# Patient Record
Sex: FEMALE | Race: WHITE | NOT HISPANIC OR LATINO
[De-identification: names, ages, dates, MRNs, and addresses within clinical notes are randomized per-mention and may not be internally consistent; named-entity substitution may affect disease eponyms.]

---

## 2017-07-31 ENCOUNTER — APPOINTMENT (OUTPATIENT)
Dept: OBGYN | Facility: CLINIC | Age: 32
End: 2017-07-31
Payer: COMMERCIAL

## 2017-07-31 VITALS
HEART RATE: 92 BPM | WEIGHT: 186 LBS | SYSTOLIC BLOOD PRESSURE: 127 MMHG | DIASTOLIC BLOOD PRESSURE: 84 MMHG | HEIGHT: 60 IN | BODY MASS INDEX: 36.52 KG/M2

## 2017-07-31 DIAGNOSIS — Z86.59 PERSONAL HISTORY OF OTHER MENTAL AND BEHAVIORAL DISORDERS: ICD-10-CM

## 2017-07-31 DIAGNOSIS — Z20.2 CONTACT WITH AND (SUSPECTED) EXPOSURE TO INFECTIONS WITH A PREDOMINANTLY SEXUAL MODE OF TRANSMISSION: ICD-10-CM

## 2017-07-31 DIAGNOSIS — Z87.81 PERSONAL HISTORY OF (HEALED) TRAUMATIC FRACTURE: ICD-10-CM

## 2017-07-31 DIAGNOSIS — Z86.39 PERSONAL HISTORY OF OTHER ENDOCRINE, NUTRITIONAL AND METABOLIC DISEASE: ICD-10-CM

## 2017-07-31 DIAGNOSIS — Z87.898 PERSONAL HISTORY OF OTHER SPECIFIED CONDITIONS: ICD-10-CM

## 2017-07-31 DIAGNOSIS — Z87.09 PERSONAL HISTORY OF OTHER DISEASES OF THE RESPIRATORY SYSTEM: ICD-10-CM

## 2017-07-31 DIAGNOSIS — Z87.39 PERSONAL HISTORY OF OTHER DISEASES OF THE MUSCULOSKELETAL SYSTEM AND CONNECTIVE TISSUE: ICD-10-CM

## 2017-07-31 PROCEDURE — 99385 PREV VISIT NEW AGE 18-39: CPT

## 2017-07-31 RX ORDER — TOPIRAMATE 25 MG
CAPSULE, SPRINKLE ORAL
Refills: 0 | Status: ACTIVE | COMMUNITY

## 2017-07-31 RX ORDER — ATOMOXETINE HYDROCHLORIDE 25 MG/1
25 CAPSULE ORAL
Refills: 0 | Status: ACTIVE | COMMUNITY

## 2017-07-31 RX ORDER — FLUOXETINE HYDROCHLORIDE 40 MG/1
40 CAPSULE ORAL
Refills: 0 | Status: ACTIVE | COMMUNITY

## 2017-07-31 RX ORDER — ARIPIPRAZOLE 2 MG/1
2 TABLET ORAL
Refills: 0 | Status: ACTIVE | COMMUNITY

## 2017-07-31 RX ORDER — LEVOTHYROXINE SODIUM 0.17 MG/1
TABLET ORAL
Refills: 0 | Status: ACTIVE | COMMUNITY

## 2017-08-01 LAB
C TRACH RRNA SPEC QL NAA+PROBE: NORMAL
HPV HIGH+LOW RISK DNA PNL CVX: POSITIVE
N GONORRHOEA RRNA SPEC QL NAA+PROBE: NORMAL
SOURCE TP AMPLIFICATION: NORMAL

## 2017-08-03 LAB — CYTOLOGY CVX/VAG DOC THIN PREP: NORMAL

## 2017-08-07 ENCOUNTER — LABORATORY RESULT (OUTPATIENT)
Age: 32
End: 2017-08-07

## 2017-08-17 ENCOUNTER — APPOINTMENT (OUTPATIENT)
Dept: OBGYN | Facility: CLINIC | Age: 32
End: 2017-08-17
Payer: COMMERCIAL

## 2017-08-17 VITALS
DIASTOLIC BLOOD PRESSURE: 80 MMHG | SYSTOLIC BLOOD PRESSURE: 120 MMHG | WEIGHT: 186 LBS | HEIGHT: 60 IN | BODY MASS INDEX: 36.52 KG/M2

## 2017-08-17 LAB
HCG UR QL: NEGATIVE
QUALITY CONTROL: YES

## 2017-08-17 PROCEDURE — 57454 BX/CURETT OF CERVIX W/SCOPE: CPT

## 2017-08-17 PROCEDURE — 81025 URINE PREGNANCY TEST: CPT

## 2017-08-28 LAB — CORE LAB BIOPSY: NORMAL

## 2017-09-11 ENCOUNTER — APPOINTMENT (OUTPATIENT)
Dept: OBGYN | Facility: CLINIC | Age: 32
End: 2017-09-11
Payer: COMMERCIAL

## 2017-09-11 VITALS
HEIGHT: 60 IN | HEART RATE: 109 BPM | WEIGHT: 187 LBS | DIASTOLIC BLOOD PRESSURE: 84 MMHG | SYSTOLIC BLOOD PRESSURE: 120 MMHG | BODY MASS INDEX: 36.71 KG/M2

## 2017-09-11 PROCEDURE — 99213 OFFICE O/P EST LOW 20 MIN: CPT

## 2017-09-30 ENCOUNTER — TRANSCRIPTION ENCOUNTER (OUTPATIENT)
Age: 32
End: 2017-09-30

## 2018-02-05 ENCOUNTER — APPOINTMENT (OUTPATIENT)
Dept: OBGYN | Facility: CLINIC | Age: 33
End: 2018-02-05
Payer: COMMERCIAL

## 2018-02-05 VITALS
DIASTOLIC BLOOD PRESSURE: 70 MMHG | HEIGHT: 60 IN | SYSTOLIC BLOOD PRESSURE: 106 MMHG | BODY MASS INDEX: 37.89 KG/M2 | WEIGHT: 193 LBS

## 2018-02-05 DIAGNOSIS — R39.15 URGENCY OF URINATION: ICD-10-CM

## 2018-02-05 LAB
BILIRUB UR QL STRIP: NEGATIVE
GLUCOSE UR-MCNC: NEGATIVE
HCG UR QL: 0.2 EU/DL
HGB UR QL STRIP.AUTO: NORMAL
KETONES UR-MCNC: NEGATIVE
LEUKOCYTE ESTERASE UR QL STRIP: NEGATIVE
NITRITE UR QL STRIP: NEGATIVE
PH UR STRIP: 7
PROT UR STRIP-MCNC: NEGATIVE
SP GR UR STRIP: 1.01

## 2018-02-05 PROCEDURE — 99214 OFFICE O/P EST MOD 30 MIN: CPT

## 2018-02-05 PROCEDURE — 81003 URINALYSIS AUTO W/O SCOPE: CPT | Mod: QW

## 2018-02-06 LAB — HPV HIGH+LOW RISK DNA PNL CVX: NOT DETECTED

## 2018-02-09 LAB — BACTERIA UR CULT: ABNORMAL

## 2018-02-12 LAB — CYTOLOGY CVX/VAG DOC THIN PREP: NORMAL

## 2018-07-23 PROBLEM — Z86.59 HISTORY OF ANXIETY: Status: RESOLVED | Noted: 2017-07-31 | Resolved: 2018-07-23

## 2019-06-08 ENCOUNTER — APPOINTMENT (OUTPATIENT)
Dept: OBGYN | Facility: CLINIC | Age: 34
End: 2019-06-08
Payer: COMMERCIAL

## 2019-06-08 VITALS
HEART RATE: 101 BPM | HEIGHT: 60 IN | BODY MASS INDEX: 36.32 KG/M2 | SYSTOLIC BLOOD PRESSURE: 118 MMHG | WEIGHT: 185 LBS | DIASTOLIC BLOOD PRESSURE: 83 MMHG

## 2019-06-08 DIAGNOSIS — Z78.9 OTHER SPECIFIED HEALTH STATUS: ICD-10-CM

## 2019-06-08 DIAGNOSIS — B37.2 CANDIDIASIS OF SKIN AND NAIL: ICD-10-CM

## 2019-06-08 PROCEDURE — 99214 OFFICE O/P EST MOD 30 MIN: CPT

## 2019-06-08 RX ORDER — ATOMOXETINE 100 MG/1
100 CAPSULE ORAL
Qty: 30 | Refills: 0 | Status: ACTIVE | COMMUNITY
Start: 2018-08-24

## 2019-06-08 RX ORDER — OSELTAMIVIR PHOSPHATE 75 MG/1
75 CAPSULE ORAL
Qty: 10 | Refills: 0 | Status: COMPLETED | COMMUNITY
Start: 2019-02-09

## 2019-06-08 RX ORDER — NITROFURANTOIN (MONOHYDRATE/MACROCRYSTALS) 25; 75 MG/1; MG/1
100 CAPSULE ORAL
Qty: 10 | Refills: 0 | Status: DISCONTINUED | COMMUNITY
Start: 2018-02-05 | End: 2019-06-08

## 2019-06-08 RX ORDER — SULFAMETHOXAZOLE AND TRIMETHOPRIM 800; 160 MG/1; MG/1
800-160 TABLET ORAL
Qty: 10 | Refills: 0 | Status: COMPLETED | COMMUNITY
Start: 2018-02-09 | End: 2019-06-08

## 2019-06-08 RX ORDER — TOPIRAMATE 25 MG/1
25 TABLET, FILM COATED ORAL
Qty: 90 | Refills: 0 | Status: ACTIVE | COMMUNITY
Start: 2018-08-13

## 2019-06-08 RX ORDER — AZITHROMYCIN 250 MG/1
250 TABLET, FILM COATED ORAL
Qty: 6 | Refills: 0 | Status: COMPLETED | COMMUNITY
Start: 2019-02-09

## 2019-06-08 RX ORDER — AMOXICILLIN AND CLAVULANATE POTASSIUM 875; 125 MG/1; MG/1
875-125 TABLET, COATED ORAL
Qty: 14 | Refills: 0 | Status: COMPLETED | COMMUNITY
Start: 2019-02-26

## 2019-06-08 RX ORDER — FLUOXETINE HYDROCHLORIDE 40 MG/1
CAPSULE ORAL
Refills: 0 | Status: ACTIVE | COMMUNITY

## 2019-06-08 RX ORDER — CLOTRIMAZOLE AND BETAMETHASONE DIPROPIONATE 10; .5 MG/G; MG/G
1-0.05 CREAM TOPICAL TWICE DAILY
Qty: 1 | Refills: 0 | Status: ACTIVE | COMMUNITY
Start: 2019-06-08 | End: 1900-01-01

## 2019-06-08 RX ORDER — ARIPIPRAZOLE 30 MG/1
30 TABLET ORAL
Qty: 30 | Refills: 0 | Status: ACTIVE | COMMUNITY
Start: 2019-02-03

## 2019-06-08 RX ORDER — LITHIUM CARBONATE 300 MG/1
300 TABLET, FILM COATED, EXTENDED RELEASE ORAL
Qty: 90 | Refills: 0 | Status: ACTIVE | COMMUNITY
Start: 2018-12-17

## 2019-06-08 RX ORDER — FLUOXETINE HYDROCHLORIDE 20 MG/1
20 CAPSULE ORAL
Qty: 30 | Refills: 0 | Status: ACTIVE | COMMUNITY
Start: 2019-02-18

## 2019-06-08 NOTE — PHYSICAL EXAM
[No Bleeding] : there was no active vaginal bleeding [Uterine Adnexae] : were not tender and not enlarged [Normal] : urethra [FreeTextEntry4] : clear dc cultured [de-identified] : slightly erythematous [de-identified] : cutaneus monila rash in the groin

## 2019-06-08 NOTE — PHYSICAL EXAM
[No Bleeding] : there was no active vaginal bleeding [Uterine Adnexae] : were not tender and not enlarged [Normal] : urethra [de-identified] : cutaneus monila rash in the groin  [FreeTextEntry4] : clear dc cultured [de-identified] : slightly erythematous

## 2019-06-11 LAB — BACTERIA GENITAL AEROBE CULT: NORMAL

## 2019-06-17 ENCOUNTER — APPOINTMENT (OUTPATIENT)
Dept: OBGYN | Facility: CLINIC | Age: 34
End: 2019-06-17
Payer: COMMERCIAL

## 2019-06-17 VITALS
HEIGHT: 60 IN | DIASTOLIC BLOOD PRESSURE: 70 MMHG | WEIGHT: 185 LBS | BODY MASS INDEX: 36.32 KG/M2 | SYSTOLIC BLOOD PRESSURE: 120 MMHG

## 2019-06-17 PROCEDURE — 99395 PREV VISIT EST AGE 18-39: CPT

## 2019-06-17 NOTE — PHYSICAL EXAM
[Alert] : alert [Acute Distress] : no acute distress [Awake] : awake [Nipple Discharge] : no nipple discharge [Mass] : no breast mass [Axillary LAD] : no axillary lymphadenopathy [Soft] : soft [Oriented x3] : oriented to person, place, and time [Tender] : non tender [No Bleeding] : there was no active vaginal bleeding [Uterine Adnexae] : were not tender and not enlarged [Normal] : uterus [CTAB] : CTAB [RRR, No Murmurs] : RRR, no murmurs

## 2019-06-18 LAB
C TRACH RRNA SPEC QL NAA+PROBE: NOT DETECTED
HPV HIGH+LOW RISK DNA PNL CVX: NOT DETECTED
N GONORRHOEA RRNA SPEC QL NAA+PROBE: NOT DETECTED
SOURCE AMPLIFICATION: NORMAL

## 2019-06-20 LAB — CYTOLOGY CVX/VAG DOC THIN PREP: NORMAL

## 2019-07-22 ENCOUNTER — APPOINTMENT (OUTPATIENT)
Dept: OBGYN | Facility: CLINIC | Age: 34
End: 2019-07-22

## 2019-07-30 ENCOUNTER — APPOINTMENT (OUTPATIENT)
Dept: OBGYN | Facility: CLINIC | Age: 34
End: 2019-07-30
Payer: COMMERCIAL

## 2019-07-30 PROCEDURE — 57454 BX/CURETT OF CERVIX W/SCOPE: CPT

## 2019-07-30 NOTE — PROCEDURE
[Colposcopy] : colposcopy [LGSIL] : low grade squamous intraepithelial lesion [Patient] : patient [Risks] : risks [Benefits] : benefits [Alternatives] : alternatives [Infection] : infection [Bleeding] : bleeding [Allergic Reaction] : allergic reaction [Consent Obtained] : written consent was obtained prior to the procedure [No Abnormalities] : no abnormalities [Biopsies Taken: # ___] : [unfilled] biopsies taken of the cervix [Acetowhite ___ o'clock] : ascetowhite changes at [unfilled] ~Uo'clock [Biopsy Locations ___ o'clock] : the biopsies were taken at [unfilled] o'clock [ECC Done] : Endocervical curettage was performed.  [Luan's] : Luan's solution [No Complications] : there were no complications [Tolerated Well] : the patient tolerated the procedure well

## 2019-08-05 LAB — CORE LAB BIOPSY: NORMAL

## 2019-08-19 ENCOUNTER — APPOINTMENT (OUTPATIENT)
Dept: OBGYN | Facility: CLINIC | Age: 34
End: 2019-08-19
Payer: COMMERCIAL

## 2019-08-19 VITALS
DIASTOLIC BLOOD PRESSURE: 86 MMHG | BODY MASS INDEX: 37.89 KG/M2 | HEART RATE: 109 BPM | WEIGHT: 193 LBS | SYSTOLIC BLOOD PRESSURE: 119 MMHG | HEIGHT: 60 IN

## 2019-08-19 PROCEDURE — 56501 DESTROY VULVA LESIONS SIM: CPT

## 2021-02-08 ENCOUNTER — APPOINTMENT (OUTPATIENT)
Dept: OBGYN | Facility: CLINIC | Age: 36
End: 2021-02-08
Payer: COMMERCIAL

## 2021-02-08 VITALS
HEIGHT: 60 IN | DIASTOLIC BLOOD PRESSURE: 82 MMHG | WEIGHT: 181 LBS | SYSTOLIC BLOOD PRESSURE: 119 MMHG | BODY MASS INDEX: 35.53 KG/M2 | HEART RATE: 94 BPM

## 2021-02-08 DIAGNOSIS — Z11.3 ENCOUNTER FOR SCREENING FOR INFECTIONS WITH A PREDOMINANTLY SEXUAL MODE OF TRANSMISSION: ICD-10-CM

## 2021-02-08 DIAGNOSIS — B97.7 PAPILLOMAVIRUS AS THE CAUSE OF DISEASES CLASSIFIED ELSEWHERE: ICD-10-CM

## 2021-02-08 DIAGNOSIS — Z01.419 ENCOUNTER FOR GYNECOLOGICAL EXAMINATION (GENERAL) (ROUTINE) W/OUT ABNORMAL FINDINGS: ICD-10-CM

## 2021-02-08 PROCEDURE — 99072 ADDL SUPL MATRL&STAF TM PHE: CPT

## 2021-02-08 PROCEDURE — 99395 PREV VISIT EST AGE 18-39: CPT

## 2021-02-15 LAB
C TRACH RRNA SPEC QL NAA+PROBE: NOT DETECTED
HPV HIGH+LOW RISK DNA PNL CVX: NOT DETECTED
N GONORRHOEA RRNA SPEC QL NAA+PROBE: NOT DETECTED
SOURCE TP AMPLIFICATION: NORMAL

## 2021-02-22 LAB — CYTOLOGY CVX/VAG DOC THIN PREP: ABNORMAL

## 2021-04-07 ENCOUNTER — APPOINTMENT (OUTPATIENT)
Dept: ORTHOPEDIC SURGERY | Facility: CLINIC | Age: 36
End: 2021-04-07
Payer: COMMERCIAL

## 2021-04-07 VITALS
DIASTOLIC BLOOD PRESSURE: 86 MMHG | WEIGHT: 181 LBS | SYSTOLIC BLOOD PRESSURE: 120 MMHG | TEMPERATURE: 97.2 F | HEIGHT: 60 IN | HEART RATE: 94 BPM | BODY MASS INDEX: 35.53 KG/M2

## 2021-04-07 DIAGNOSIS — Z86.59 PERSONAL HISTORY OF OTHER MENTAL AND BEHAVIORAL DISORDERS: ICD-10-CM

## 2021-04-07 PROCEDURE — 99072 ADDL SUPL MATRL&STAF TM PHE: CPT

## 2021-04-07 PROCEDURE — 73562 X-RAY EXAM OF KNEE 3: CPT | Mod: RT

## 2021-04-07 PROCEDURE — 99204 OFFICE O/P NEW MOD 45 MIN: CPT

## 2021-04-07 NOTE — DISCUSSION/SUMMARY
[de-identified] : 34 y/o F with mild posttraumatic osteoarthritis and evidence of an ACL reconstruction in the right knee. She is s/p ACL autograph reconstruction. The pt only experiences pain while kneeling. The patient will get an MRI of the right knee to r/o a medial parameniscal cyst. Additionally, the patient occasionally has instability in her right knee. She is a candidate for an ACL allograft revision which was discussed in detail with the patient. She is not interested in having the revision ACL reconstruction. F/u after the MRI. \par \par \par ACL reconstruction allograft: With the diagnosis of a completely torn anterior cruciate ligament (ACL), the choices are non-operative versus operative. The non-operative success is approximately a 33% chance of the patient's returning to having a functional knee again. By that I mean back to their sport endeavors. Two-thirds of the patients for the most part and as a generalization will go on to have problems such as tears in the meniscus, destruction of articular cartilage, and eventually arthritis. All of these patients will be required to use a brace and must always participate in a conditioning (strengthening) program, if they plan to participate in any sports. The other option is operative intervention, which, today, yields success rates ranging from 85 to 90%. Success is once again defined as an ability to return to a functional sporting lifestyle with or without a brace. In this case, I would, if the patient chooses surgery recommend an anterior cruciate ligament reconstruction with bone patella tendon bone allograft. The allograft, which necessitated a 2-3 inch skin incision, is arthroscopically positioned through the tibial and femoral tunnels to isometrically recreate the normal tension of the anterior cruciate ligament. The graft, once situated, is then fixed with two screws that are compressive within the tunnels. Sometimes on the tibial side, we use a tibial post, where a suture is tied around a screw placed perpendicular to the shaft of the tibia. The choice of technique is dependent upon the length of the bone tendon bone preparation. In some cases it is necessary due to subsequent discomfort that the screw be removed a year or so after surgery. The usual course for an ACL reconstruction is hospitalization of 24 hours (range 0 to 48 hours) with immediate continuous passive motion and partial to full weight bearing crutch walking. Crutches are usually dispensed with by 2 to 3 weeks. Patients who have desk jobs are usually back at work within a week. Driving is not permitted until there is good control of this leg. It will usually be 3 to 6 weeks that the patient is restricted from driving. The motion stage of therapy takes about 3 weeks while the strength stage averages 6 months from surgery for patients to return to sports. I let the patient know that some people have done it as early as 3 months, which I think is somewhat unrealistic. I have seen people not work hard and become a disaster, taking as long as 2 years. While the success rate (return to a functional sports lifestyle) is 85 to 90% potential, there are adverse complications. Complications include infection (approximately 1%) peroneal nerve palsies which would mean loss of foot function (<1%), loss of screw fixation at either the tibial or femoral tunnels (<1%), fracture of the patella (<1%), rupture of the remaining patella tendon (<1%), and fracture of the tibia where the bone graft was taken from (<1%). Other bizarre complications include reflex sympathetic dystrophy, which means that the patient has pain, which is totally out of proportion with the findings. This requires multiple manipulation procedures in physical therapy and can become an over bearing part of the patient's life for well over several years. Many of the manipulations might have to be performed with associated arthroscopy. Infrapatellar contracture syndromes are also another rare complication where the patient has limited extension and flexion due to scarring. This is also a very difficult experience for the patient and will often require multiple surgical procedures, arthroscopy, manipulation, and sometimes arthrotomy. I have informed the patient that the meniscus and/or articular cartilage can be injured concurrently with the anterior cruciate ligament. With the meniscus tears the basic idea is to repair (if possible) or remove as little of the tear as necessary. When either of these procedures is performed the postoperative routine are potential complications parallel those of the ACL reconstruction. With articular cartilage damage, the surface can be shaved, and in certain situations a portion placed back in situ. Sometimes the only treatment is drilling or eburnating the bone for the theoretical advantage of stimulating fibrocartilage growth (50/50 chance). When either of these procedures is performed the postoperative routine are potential complications parallel those of the ACL reconstruction. The allograft is basically a cadaver transplant of a bone patella tendon bone preparation. Other than the lack of harvesting the patient's tissue, there are basically no differences in the surgical technique of an autograft. However, there are two major sources of concern using an allograft. One, there is a theoretical albeit extremely rare risk of AIDS. We have heard figures suggesting a statistical risk from 1 in 2 million to 1 in 8 million. Obviously these are low risk of a fatal disease exist. For any one patient, there is really 0 or 100% risk of keenan the HIV virus. The second issue, is the ability of the patient (host) to incorporate the allograft. At the present time there does not seem to be any long tern adverse effects that negate the early effectiveness of allograft reconstruction for a deficient anterior cruciate ligament. The biology does, however, seem to mean that true incorporation is microscopically longer than an autograft procedure. Further studies are unquestionably forthcoming and at this point this is the best information that I can give this patient. I have informed this patient that any one of these complications can basically be a life long disaster and can make them much worse off then should they have chosen the non-operative treatment. I think the patient understands the potential complications. I think the patient also understands the percentage of successes and failures. The choice is now theirs.

## 2021-04-07 NOTE — END OF VISIT
[FreeTextEntry3] : I, Pritesh Lindsey, acted solely as a scribe for Dr. Ed Ley on this date 04/07/2021.

## 2021-04-07 NOTE — HISTORY OF PRESENT ILLNESS
[___ mths] : [unfilled] month(s) ago [0] : a minimum pain level of 0/10 [10] : a maximum pain level of 10/10 [Rest] : relieved by rest [Intermit.] : ~He/She~ states the symptoms seem to be intermittent [de-identified] : 36 y/o F presents with right knee pain which started on 2/1/2021. She previously tore her ACL and meniscus in 2011. Pt is s/p ACL autograph reconstruction from 2011. A couple of years later, she re-tore her meniscus but was not treated surgically. She consulted Dr. Ladinsky on 3/2/2021. She only experiences pain when she kneels due to "a bump on the knee." She describes the pain as sharp and stabbing. Her knee sometimes jeanie when she walks. [de-identified] : kneeling

## 2021-04-07 NOTE — PHYSICAL EXAM
[LE] : Sensory: Intact in bilateral lower extremities [Normal] : Alert and in no acute distress [ALL] : dorsalis pedis, posterior tibial, femoral, popliteal, and radial 2+ and symmetric bilaterally [Poor Appearance] : well-appearing [de-identified] : GENERAL APPEARANCE: Well nourished and hydrated, pleasant, alert, and oriented x 3. Appears their stated age. \par HEENT: Normocephalic, extraocular eye motion intact. Nasal septum midline. Oral cavity clear. External auditory canal clear. \par RESPIRATORY: Breath sounds clear and audible in all lobes. No wheezing, No accessory muscle use.\par CARDIOVASCULAR: No apparent abnormalities. No lower leg edema. No varicosities. Pedal pulses are palpable.\par NEUROLOGIC: Sensation is normal, no muscle weakness in the upper or lower extremities.\par DERMATOLOGIC: No apparent skin lesions, moist, warm, no rash.\par SPINE: Cervical spine appears normal and moves freely; thoracic spine appears normal and moves freely; lumbosacral spine appears normal and moves freely, normal, nontender.\par MUSCULOSKELETAL: Hands, wrists, and elbows are normal and move freely, shoulders are normal and move freely.  [de-identified] : Right knee exam shows healed incision from ACL reconstruction with no sign of infection, no edema, positive Lochmanns\par It appears she has a medial parameniscal cyst below the medial joint line.  [de-identified] : 3V xray of the right knee done in the office today and reviewed by Dr. Ed Ley demonstrates mild posttraumatic osteoarthritis and evidence of an ACL reconstruction.

## 2021-04-17 ENCOUNTER — APPOINTMENT (OUTPATIENT)
Dept: MRI IMAGING | Facility: CLINIC | Age: 36
End: 2021-04-17
Payer: COMMERCIAL

## 2021-04-17 ENCOUNTER — OUTPATIENT (OUTPATIENT)
Dept: OUTPATIENT SERVICES | Facility: HOSPITAL | Age: 36
LOS: 1 days | End: 2021-04-17

## 2021-04-17 DIAGNOSIS — S83.511D SPRAIN OF ANTERIOR CRUCIATE LIGAMENT OF RIGHT KNEE, SUBSEQUENT ENCOUNTER: ICD-10-CM

## 2021-04-17 DIAGNOSIS — Z98.89 OTHER SPECIFIED POSTPROCEDURAL STATES: Chronic | ICD-10-CM

## 2021-04-17 PROCEDURE — 73721 MRI JNT OF LWR EXTRE W/O DYE: CPT | Mod: 26,RT

## 2021-04-23 ENCOUNTER — APPOINTMENT (OUTPATIENT)
Dept: ORTHOPEDIC SURGERY | Facility: CLINIC | Age: 36
End: 2021-04-23
Payer: COMMERCIAL

## 2021-04-23 VITALS
HEART RATE: 105 BPM | SYSTOLIC BLOOD PRESSURE: 134 MMHG | DIASTOLIC BLOOD PRESSURE: 90 MMHG | HEIGHT: 60 IN | WEIGHT: 181 LBS | BODY MASS INDEX: 35.53 KG/M2

## 2021-04-23 DIAGNOSIS — M23.006 CYSTIC MENISCUS, UNSPECIFIED MENISCUS, RIGHT KNEE: ICD-10-CM

## 2021-04-23 DIAGNOSIS — S83.511D SPRAIN OF ANTERIOR CRUCIATE LIGAMENT OF RIGHT KNEE, SUBSEQUENT ENCOUNTER: ICD-10-CM

## 2021-04-23 PROCEDURE — 99213 OFFICE O/P EST LOW 20 MIN: CPT

## 2021-04-23 PROCEDURE — 99072 ADDL SUPL MATRL&STAF TM PHE: CPT

## 2021-04-23 NOTE — PHYSICAL EXAM
[LE] : Sensory: Intact in bilateral lower extremities [ALL] : dorsalis pedis, posterior tibial, femoral, popliteal, and radial 2+ and symmetric bilaterally [Normal] : Alert and in no acute distress [Poor Appearance] : well-appearing [de-identified] : GENERAL APPEARANCE: Well nourished and hydrated, pleasant, alert, and oriented x 3. Appears their stated age. \par HEENT: Normocephalic, extraocular eye motion intact. Nasal septum midline. Oral cavity clear. External auditory canal clear. \par RESPIRATORY: Breath sounds clear and audible in all lobes. No wheezing, No accessory muscle use.\par CARDIOVASCULAR: No apparent abnormalities. No lower leg edema. No varicosities. Pedal pulses are palpable.\par NEUROLOGIC: Sensation is normal, no muscle weakness in the upper or lower extremities.\par DERMATOLOGIC: No apparent skin lesions, moist, warm, no rash.\par SPINE: Cervical spine appears normal and moves freely; thoracic spine appears normal and moves freely; lumbosacral spine appears normal and moves freely, normal, nontender.\par MUSCULOSKELETAL: Hands, wrists, and elbows are normal and move freely, shoulders are normal and move freely.  [de-identified] : Right knee exam shows healed incision from ACL reconstruction with no sign of infection, no edema, positive Lochmanns\par cyst below the medial joint line.  [de-identified] : MRI of the right knee performed on 4/17/2021 showed the following:\par 1. Suspected high-grade tearing of the distal ACL reconstructed graft.\par 2. Cystic expansion of the tibial tunnel with extension of the cyst into the overlying soft tissues with surrounding edema. Infection can have a similar appearance and clinical correlation is recommended.\par 3. Full-thickness tear at the posterior horn/root junction of the medial meniscus. Mild chondral wear in the medial compartment.

## 2021-04-23 NOTE — DISCUSSION/SUMMARY
[de-identified] : 34 y/o F with mild posttraumatic osteoarthritis and evidence of an ACL reconstruction in the right knee. She is s/p ACL autograph reconstruction. The MRI of her right knee showed a high-grade tear in the distal ACL reconstructed graft, cystic expansion of the tibial tunnel, and a medial meniscus tear. She is having instability in the knee as well as pain while kneeling due to the cyst. We explained that the cyst is due to joint fluid going through the tibial tunnel. She inquired about an aspiration, which we explained that it may not be helpful. We had a long discussion with the patient regarding an ACL allograft revision and encourage her to consult Dr. Pereira. We provided a rx for PT to try to help strengthen her muscles and to try to alleviate her symptoms. \par \par \par ACL reconstruction allograft: With the diagnosis of a completely torn anterior cruciate ligament (ACL), the choices are non-operative versus operative. The non-operative success is approximately a 33% chance of the patient's returning to having a functional knee again. By that I mean back to their sport endeavors. Two-thirds of the patients for the most part and as a generalization will go on to have problems such as tears in the meniscus, destruction of articular cartilage, and eventually arthritis. All of these patients will be required to use a brace and must always participate in a conditioning (strengthening) program, if they plan to participate in any sports. The other option is operative intervention, which, today, yields success rates ranging from 85 to 90%. Success is once again defined as an ability to return to a functional sporting lifestyle with or without a brace. In this case, I would, if the patient chooses surgery recommend an anterior cruciate ligament reconstruction with bone patella tendon bone allograft. The allograft, which necessitated a 2-3 inch skin incision, is arthroscopically positioned through the tibial and femoral tunnels to isometrically recreate the normal tension of the anterior cruciate ligament. The graft, once situated, is then fixed with two screws that are compressive within the tunnels. Sometimes on the tibial side, we use a tibial post, where a suture is tied around a screw placed perpendicular to the shaft of the tibia. The choice of technique is dependent upon the length of the bone tendon bone preparation. In some cases it is necessary due to subsequent discomfort that the screw be removed a year or so after surgery. The usual course for an ACL reconstruction is hospitalization of 24 hours (range 0 to 48 hours) with immediate continuous passive motion and partial to full weight bearing crutch walking. Crutches are usually dispensed with by 2 to 3 weeks. Patients who have desk jobs are usually back at work within a week. Driving is not permitted until there is good control of this leg. It will usually be 3 to 6 weeks that the patient is restricted from driving. The motion stage of therapy takes about 3 weeks while the strength stage averages 6 months from surgery for patients to return to sports. I let the patient know that some people have done it as early as 3 months, which I think is somewhat unrealistic. I have seen people not work hard and become a disaster, taking as long as 2 years. While the success rate (return to a functional sports lifestyle) is 85 to 90% potential, there are adverse complications. Complications include infection (approximately 1%) peroneal nerve palsies which would mean loss of foot function (<1%), loss of screw fixation at either the tibial or femoral tunnels (<1%), fracture of the patella (<1%), rupture of the remaining patella tendon (<1%), and fracture of the tibia where the bone graft was taken from (<1%). Other bizarre complications include reflex sympathetic dystrophy, which means that the patient has pain, which is totally out of proportion with the findings. This requires multiple manipulation procedures in physical therapy and can become an over bearing part of the patient's life for well over several years. Many of the manipulations might have to be performed with associated arthroscopy. Infrapatellar contracture syndromes are also another rare complication where the patient has limited extension and flexion due to scarring. This is also a very difficult experience for the patient and will often require multiple surgical procedures, arthroscopy, manipulation, and sometimes arthrotomy. I have informed the patient that the meniscus and/or articular cartilage can be injured concurrently with the anterior cruciate ligament. With the meniscus tears the basic idea is to repair (if possible) or remove as little of the tear as necessary. When either of these procedures is performed the postoperative routine are potential complications parallel those of the ACL reconstruction. With articular cartilage damage, the surface can be shaved, and in certain situations a portion placed back in situ. Sometimes the only treatment is drilling or eburnating the bone for the theoretical advantage of stimulating fibrocartilage growth (50/50 chance). When either of these procedures is performed the postoperative routine are potential complications parallel those of the ACL reconstruction. The allograft is basically a cadaver transplant of a bone patella tendon bone preparation. Other than the lack of harvesting the patient's tissue, there are basically no differences in the surgical technique of an autograft. However, there are two major sources of concern using an allograft. One, there is a theoretical albeit extremely rare risk of AIDS. We have heard figures suggesting a statistical risk from 1 in 2 million to 1 in 8 million. Obviously these are low risk of a fatal disease exist. For any one patient, there is really 0 or 100% risk of keenan the HIV virus. The second issue, is the ability of the patient (host) to incorporate the allograft. At the present time there does not seem to be any long tern adverse effects that negate the early effectiveness of allograft reconstruction for a deficient anterior cruciate ligament. The biology does, however, seem to mean that true incorporation is microscopically longer than an autograft procedure. Further studies are unquestionably forthcoming and at this point this is the best information that I can give this patient. I have informed this patient that any one of these complications can basically be a life long disaster and can make them much worse off then should they have chosen the non-operative treatment. I think the patient understands the potential complications. I think the patient also understands the percentage of successes and failures. The choice is now theirs.

## 2021-04-23 NOTE — HISTORY OF PRESENT ILLNESS
[___ mths] : [unfilled] month(s) ago [0] : a minimum pain level of 0/10 [10] : a maximum pain level of 10/10 [Intermit.] : ~He/She~ states the symptoms seem to be intermittent [Rest] : relieved by rest [de-identified] : 36 y/o F presents with right knee pain which started on 2/1/2021. She previously tore her ACL and meniscus in 2011. Pt is s/p ACL autograph reconstruction from 2011. A couple of years later, she re-tore her meniscus but was not treated surgically. She consulted Dr. Ladinsky on 3/2/2021. She only experiences pain when she kneels due to "a bump on the knee." She describes the pain as sharp and stabbing. Her knee sometimes jeanie when she walks. Pt also reports instability with stairs. She had an MRI on 4/17/2021 of the right knee which showed a high-grade tear in the distal ACL reconstructed graft, cystic expansion of the tibial tunnel, and a medial meniscus tear.  [de-identified] : kneeling

## 2021-05-12 ENCOUNTER — APPOINTMENT (OUTPATIENT)
Dept: ORTHOPEDIC SURGERY | Facility: CLINIC | Age: 36
End: 2021-05-12
Payer: COMMERCIAL

## 2021-05-12 DIAGNOSIS — S93.402A SPRAIN OF UNSPECIFIED LIGAMENT OF LEFT ANKLE, INITIAL ENCOUNTER: ICD-10-CM

## 2021-05-12 DIAGNOSIS — M25.561 PAIN IN RIGHT KNEE: ICD-10-CM

## 2021-05-12 PROCEDURE — 99072 ADDL SUPL MATRL&STAF TM PHE: CPT

## 2021-05-12 PROCEDURE — 99203 OFFICE O/P NEW LOW 30 MIN: CPT

## 2021-05-12 NOTE — HISTORY OF PRESENT ILLNESS
[de-identified] : Corinne is a pleasant 35-year-old female with a past medical history of bipolar disorder who presents to the office today with her parents for evaluation of right knee pain and instability.  The patient states that she sustained an ACL injury approximately 10 years ago for which she underwent an ACL reconstruction with hamstring autograft with another surgeon who is .  She believes she had the surgery now [facility].  She recovered uneventfully from the surgery and had no issues up until 2 years ago when she felt her knee pop while going down the stairs.  Since that time she has had worsening buckling and instability episodes in the knee.  An MRI of the knee was recently obtained which showed straits retearing of her ACL graft as well as a medial meniscus tear and a ganglion cyst about the tibial tunnel.  She saw Dr. Ley who discussed the need for possible revision ACL surgery and referred her to me for further evaluation.  The patient states her pain is activity related alleviated by rest.  Hurts to kneel on the job where she works at [employer].  She does not heavy lifting on the job and it does limit her activities to a certain extent.  She states the instability is also becoming more frequent and her knee jeanie and gives out frequently when she twists.  She is not taking anything for pain.  She has had no recent therapy.  She has not had any injections to her knee before.  The patient denies any fevers, chills, sweats, recent illnesses, numbness, tingling, weakness, or pain elsewhere at this time.

## 2021-05-12 NOTE — PHYSICAL EXAM
[de-identified] : General:\par Awake, alert, no acute distress, Patient was cooperative and appropriate during the examination.\par \par The patient is overweight for height and age.\par \par Ambulates without an antalgic gait.\par \par Full, painless range of motion of the neck and back.\par \par Exam of the bilateral lower extremities is intact and symmetric with regards to dermatologic, vascular, and neurologic exam. Bilateral lower extremity sensation is grossly intact to light touch in the DP/SP/T/S/S nerve distributions. Intact DF/PF/EHL. BIlateral lower extremity warm and well-perfused with brisk capillary refill.\par \par Pulmonary:\par Regular, nonlabored breathing\par \par Abdomen:\par Soft, nontender, nondistended.\par \par Lymphatic:\par No evidence of inguinal lymphadenopathy\par \par Right knee Examination:\par Physical examination of the knee demonstrates normal skin without signs of skin changes or abnormalities. No erythema, warmth, or joint effusion is appreciated.\par \par Sensation is intact to light touch L2-S1\par Palpable DP/PT pulse\par EHL/FHL/TA/GSC motor function intact\par \par Range of Motion\par 0 to 135 degrees\par \par Strength Testing\par Quadriceps/Hamstring 5/5\par Patient is able to perform a straight leg raise without difficulty.\par \par Palpation\par Not tender to palpation about the distal femur or patella\par Mildly tender to palpation about the anteromedial tibia\par No palpable defect appreciated in the quadriceps or patellar tendons\par Moderately tender to palpation of medial joint line\par Not tender to palpation of lateral joint line\par \par Special Tests\par Anterior Drawer positive\par Posterior Drawer negative\par Lachman Exam positive\par No Varus or Valgus Laxity at 0 or 30 degrees of knee flexion\par May's Test equivocal medially\par Active compression of the patella negative for pain or crepitus\par Translation of the patella less than 2 quadrants with a firm endpoint\par Dial test negative\par \par \par \par \par \par \par  [de-identified] : MRI of the right knee from a [facility] imaging facility was reviewed with the patient today in the office.  Patient is status post ACL reconstruction with hamstring autograft which is nearly completely ruptured near its tibial insertion.  There is cystic expansion of the tibial tunnel with extension of the cyst into the overlying soft tissues with surrounding edema.  There is a full-thickness tear of the posterior horn/root junction of the medial meniscus.  There is also mild chondral wear in the medial compartment.

## 2021-05-12 NOTE — DISCUSSION/SUMMARY
[de-identified] : Assessment: 35-year-old female with right knee pain and instability secondary to a ruptured ACL graft, medial meniscus tear, and soft tissue cyst about the anteromedial knee\par \par Plan: I had a long discussion with the patient today regarding the nature of their diagnosis and treatment plan. We discussed the risks and benefits of no treatment as well as nonoperative and operative treatments.  I reviewed the patient's MRI today with her in the office which demonstrates a fully ruptured ACL graft the medial meniscal root tear in addition to cystic changes about the tibial tunnel.  Clinically the patient has significant instability in her right knee and pain along the medial joint line.  At this time I recommend a CT scan of the knee to better quantify the amount of tunnel widening for preoperative planning purposes.  I explained to the patient and her parents that excising the cyst alone is not likely to resolve the issue as it formation is likely related to widening of the tibial tunnel.  Additionally, this would not address any symptomatic instability or pain that she has along the medial joint line.  I did discuss with the patient possible revision ACL reconstruction with medial meniscal root repair in addition to excision of her cyst.  However, I would like to obtain more information of the CT scan as if the tunnels are too wide we may need to stage the surgery and 2 procedures which would include possible bone grafting of her tunnels.  I also would like the patient to obtain her operative report from her previous surgeon for preoperative planning.  He will follow-up with me after obtaining the CT scan and the operative report to discuss surgery in greater detail.  She may treat her self symptomatically for symptoms in the meantime with resting, icing, anti-inflammatories, and activity modifications.  She was also provided with a short runner brace today in the office.\par \par The patient verbalizes their understanding and agrees with the plan.  All questions were answered to their satisfaction.

## 2021-05-12 NOTE — REVIEW OF SYSTEMS
[Joint Pain] : joint pain [Negative] : Heme/Lymph [FreeTextEntry9] : Right knee pain and instability

## 2021-06-23 ENCOUNTER — RESULT REVIEW (OUTPATIENT)
Age: 36
End: 2021-06-23

## 2021-06-23 ENCOUNTER — OUTPATIENT (OUTPATIENT)
Dept: OUTPATIENT SERVICES | Facility: HOSPITAL | Age: 36
LOS: 1 days | End: 2021-06-23
Payer: COMMERCIAL

## 2021-06-23 ENCOUNTER — APPOINTMENT (OUTPATIENT)
Dept: CT IMAGING | Facility: CLINIC | Age: 36
End: 2021-06-23
Payer: COMMERCIAL

## 2021-06-23 DIAGNOSIS — Z98.89 OTHER SPECIFIED POSTPROCEDURAL STATES: Chronic | ICD-10-CM

## 2021-06-23 DIAGNOSIS — S83.511D SPRAIN OF ANTERIOR CRUCIATE LIGAMENT OF RIGHT KNEE, SUBSEQUENT ENCOUNTER: ICD-10-CM

## 2021-06-23 PROCEDURE — 76376 3D RENDER W/INTRP POSTPROCES: CPT

## 2021-06-23 PROCEDURE — 76376 3D RENDER W/INTRP POSTPROCES: CPT | Mod: 26

## 2021-06-23 PROCEDURE — 73700 CT LOWER EXTREMITY W/O DYE: CPT

## 2021-06-23 PROCEDURE — 73700 CT LOWER EXTREMITY W/O DYE: CPT | Mod: 26,RT

## 2022-05-02 ENCOUNTER — APPOINTMENT (OUTPATIENT)
Dept: OBGYN | Facility: CLINIC | Age: 37
End: 2022-05-02
Payer: COMMERCIAL

## 2022-05-02 VITALS
DIASTOLIC BLOOD PRESSURE: 60 MMHG | BODY MASS INDEX: 34.55 KG/M2 | WEIGHT: 176 LBS | HEIGHT: 60 IN | SYSTOLIC BLOOD PRESSURE: 110 MMHG

## 2022-05-02 PROCEDURE — 99395 PREV VISIT EST AGE 18-39: CPT

## 2022-05-02 NOTE — HISTORY OF PRESENT ILLNESS
[FreeTextEntry1] : 37 yo p0 here for annual exam. in tx for bulimia (reports bulimic since age 11). \par reports periods are heavy,reports leaking through super tampons.  miissed two periods, reports that was unusual. \par also reports menstrual migraines. takes tylenol for has.

## 2022-05-05 LAB — HPV HIGH+LOW RISK DNA PNL CVX: NOT DETECTED

## 2022-05-11 LAB — CYTOLOGY CVX/VAG DOC THIN PREP: NORMAL

## 2022-06-06 ENCOUNTER — ASOB RESULT (OUTPATIENT)
Age: 37
End: 2022-06-06

## 2022-06-06 ENCOUNTER — APPOINTMENT (OUTPATIENT)
Dept: ANTEPARTUM | Facility: CLINIC | Age: 37
End: 2022-06-06
Payer: COMMERCIAL

## 2022-06-06 ENCOUNTER — APPOINTMENT (OUTPATIENT)
Dept: OBGYN | Facility: CLINIC | Age: 37
End: 2022-06-06
Payer: COMMERCIAL

## 2022-06-06 VITALS — HEIGHT: 60 IN | DIASTOLIC BLOOD PRESSURE: 72 MMHG | SYSTOLIC BLOOD PRESSURE: 114 MMHG

## 2022-06-06 DIAGNOSIS — E05.90 THYROTOXICOSIS, UNSPECIFIED W/OUT THYROTOXIC CRISIS OR STORM: ICD-10-CM

## 2022-06-06 DIAGNOSIS — N92.1 EXCESSIVE AND FREQUENT MENSTRUATION WITH IRREGULAR CYCLE: ICD-10-CM

## 2022-06-06 PROCEDURE — 76856 US EXAM PELVIC COMPLETE: CPT

## 2022-06-06 PROCEDURE — 99214 OFFICE O/P EST MOD 30 MIN: CPT

## 2022-06-06 NOTE — HISTORY OF PRESENT ILLNESS
[FreeTextEntry1] : pt here to fu after us and bw for menomet. lw showed low tsh, pt had thyroidectomy, and is being followed by endocrine, seeing them nexxt week, will adjust meds. \par pt has had issues maintaining nl thyroid levels, also maintaining therapeutic lithium levels, so is concerned about liver enzymes and med levels interacting w ocps.

## 2022-06-06 NOTE — DISCUSSION/SUMMARY
[FreeTextEntry1] : reviewed thyroid functions, fsh, lh, cbc , estrogen levels. \par dw pt and mother issues w irreg periods- incr risk hyperplasia, endo ca; and dw them treatments available for menomet. \par dw pt hormone containing iud use to mininmize enzyme effects. iud info given, \par pt will dw other mds prior to deciding, will fu after. \par spent 30 min in encounter.

## 2023-04-24 ENCOUNTER — OFFICE (OUTPATIENT)
Dept: URBAN - METROPOLITAN AREA CLINIC 113 | Facility: CLINIC | Age: 38
Setting detail: OPHTHALMOLOGY
End: 2023-04-24
Payer: COMMERCIAL

## 2023-04-24 DIAGNOSIS — H01.005: ICD-10-CM

## 2023-04-24 DIAGNOSIS — H52.7: ICD-10-CM

## 2023-04-24 DIAGNOSIS — H01.004: ICD-10-CM

## 2023-04-24 DIAGNOSIS — H01.002: ICD-10-CM

## 2023-04-24 DIAGNOSIS — H16.223: ICD-10-CM

## 2023-04-24 DIAGNOSIS — H01.001: ICD-10-CM

## 2023-04-24 PROCEDURE — 92015 DETERMINE REFRACTIVE STATE: CPT | Performed by: STUDENT IN AN ORGANIZED HEALTH CARE EDUCATION/TRAINING PROGRAM

## 2023-04-24 PROCEDURE — 92012 INTRM OPH EXAM EST PATIENT: CPT | Performed by: STUDENT IN AN ORGANIZED HEALTH CARE EDUCATION/TRAINING PROGRAM

## 2023-04-24 ASSESSMENT — REFRACTION_CURRENTRX
OD_VPRISM_DIRECTION: SV
OD_CYLINDER: -0.50
OS_AXIS: 089
OD_OVR_VA: 20/
OD_AXIS: 124
OS_OVR_VA: 20/
OD_SPHERE: -3.75
OS_VPRISM_DIRECTION: SV
OS_SPHERE: -2.50
OS_CYLINDER: -0.75

## 2023-04-24 ASSESSMENT — LID EXAM ASSESSMENTS
OD_BLEPHARITIS: RLL RUL 1+
OS_BLEPHARITIS: LLL LUL 1+

## 2023-04-24 ASSESSMENT — SPHEQUIV_DERIVED
OD_SPHEQUIV: -3.625
OS_SPHEQUIV: -2.75
OS_SPHEQUIV: -1.875
OD_SPHEQUIV: -2.5
OS_SPHEQUIV: -2.5
OD_SPHEQUIV: -2.5

## 2023-04-24 ASSESSMENT — VISUAL ACUITY
OS_BCVA: 20/20-1
OD_BCVA: 20/30-1

## 2023-04-24 ASSESSMENT — AXIALLENGTH_DERIVED
OS_AL: 23.1564
OD_AL: 23.3538
OD_AL: 22.932
OS_AL: 23.0629
OD_AL: 22.932
OS_AL: 22.8325

## 2023-04-24 ASSESSMENT — REFRACTION_MANIFEST
OD_CYLINDER: -0.50
OD_VA1: 20/20-
OS_AXIS: 101
OD_AXIS: 101
OS_CYLINDER: -1.00
OD_SPHERE: -2.25
OS_SPHERE: -2.00
OS_SPHERE: -1.50
OS_CYLINDER: -0.75
OS_VA1: 20/20-
OD_SPHERE: -2.25
OD_VA1: 20/20
OD_CYLINDER: -0.50
OD_AXIS: 102
OS_AXIS: 090
OS_VA1: 20/20-

## 2023-04-24 ASSESSMENT — KERATOMETRY
OS_AXISANGLE_DEGREES: 045
OD_AXISANGLE_DEGREES: 112
OD_K2POWER_DIOPTERS: 48.25
OD_K1POWER_DIOPTERS: 47.75
OS_K2POWER_DIOPTERS: 47.75
OS_K1POWER_DIOPTERS: 47.50

## 2023-04-24 ASSESSMENT — TONOMETRY
OS_IOP_MMHG: 18
OD_IOP_MMHG: 17

## 2023-04-24 ASSESSMENT — REFRACTION_AUTOREFRACTION
OD_CYLINDER: -0.25
OD_AXIS: 082
OS_SPHERE: -2.25
OD_SPHERE: -3.50
OS_CYLINDER: -1.00
OS_AXIS: 092

## 2023-04-24 ASSESSMENT — SUPERFICIAL PUNCTATE KERATITIS (SPK)
OD_SPK: T
OS_SPK: T

## 2023-07-10 ENCOUNTER — APPOINTMENT (OUTPATIENT)
Dept: OBGYN | Facility: CLINIC | Age: 38
End: 2023-07-10
Payer: COMMERCIAL

## 2023-07-10 VITALS
BODY MASS INDEX: 35.93 KG/M2 | SYSTOLIC BLOOD PRESSURE: 112 MMHG | DIASTOLIC BLOOD PRESSURE: 74 MMHG | HEIGHT: 60 IN | WEIGHT: 183 LBS

## 2023-07-10 DIAGNOSIS — Z00.00 ENCOUNTER FOR GENERAL ADULT MEDICAL EXAMINATION W/OUT ABNORMAL FINDINGS: ICD-10-CM

## 2023-07-10 DIAGNOSIS — N76.0 ACUTE VAGINITIS: ICD-10-CM

## 2023-07-10 PROCEDURE — 99213 OFFICE O/P EST LOW 20 MIN: CPT | Mod: 25

## 2023-07-10 PROCEDURE — 99395 PREV VISIT EST AGE 18-39: CPT

## 2023-07-10 RX ORDER — CLOTRIMAZOLE AND BETAMETHASONE DIPROPIONATE 10; .5 MG/G; MG/G
1-0.05 CREAM TOPICAL TWICE DAILY
Qty: 1 | Refills: 0 | Status: ACTIVE | COMMUNITY
Start: 2023-07-10 | End: 1900-01-01

## 2023-07-10 RX ORDER — FLUCONAZOLE 150 MG/1
150 TABLET ORAL
Qty: 2 | Refills: 1 | Status: ACTIVE | COMMUNITY
Start: 2023-07-10 | End: 1900-01-01

## 2023-07-13 NOTE — PHYSICAL EXAM
[Appropriately responsive] : appropriately responsive [Alert] : alert [No Acute Distress] : no acute distress [No Lymphadenopathy] : no lymphadenopathy [Regular Rate Rhythm] : regular rate rhythm [No Murmurs] : no murmurs [Clear to Auscultation B/L] : clear to auscultation bilaterally [Soft] : soft [Non-tender] : non-tender [Non-distended] : non-distended [No HSM] : No HSM [No Lesions] : no lesions [No Mass] : no mass [Oriented x3] : oriented x3 [Examination Of The Breasts] : a normal appearance [No Masses] : no breast masses were palpable [Labia Majora] : normal [Labia Minora] : normal [Normal] : normal [Uterine Adnexae] : normal [Vulvitis] : vulvitis

## 2023-07-13 NOTE — PLAN
[FreeTextEntry1] : vulvitis/vaginitis. \par cultures performed.\par lotrisone prescribed. \par call w results.

## 2023-07-13 NOTE — HISTORY OF PRESENT ILLNESS
[FreeTextEntry1] : 38 yo pt here for annual exam. \par co vag/vulvar itching x past 2 weeks. used vagistat. \par menses monthly\par not sa at present. \par

## 2023-07-16 LAB
A VAGINAE DNA VAG QL NAA+PROBE: NORMAL
BVAB2 DNA VAG QL NAA+PROBE: NORMAL
C KRUSEI DNA VAG QL NAA+PROBE: NEGATIVE
C TRACH RRNA SPEC QL NAA+PROBE: NEGATIVE
CANDIDA DNA VAG QL NAA+PROBE: NEGATIVE
HPV HIGH+LOW RISK DNA PNL CVX: NOT DETECTED
MEGA1 DNA VAG QL NAA+PROBE: NORMAL
N GONORRHOEA RRNA SPEC QL NAA+PROBE: NEGATIVE
T VAGINALIS RRNA SPEC QL NAA+PROBE: NEGATIVE

## 2023-07-19 LAB — CYTOLOGY CVX/VAG DOC THIN PREP: ABNORMAL

## 2024-09-16 ENCOUNTER — APPOINTMENT (OUTPATIENT)
Dept: OBGYN | Facility: CLINIC | Age: 39
End: 2024-09-16
Payer: MEDICAID

## 2024-09-16 VITALS
DIASTOLIC BLOOD PRESSURE: 79 MMHG | WEIGHT: 172 LBS | HEIGHT: 70 IN | BODY MASS INDEX: 24.62 KG/M2 | SYSTOLIC BLOOD PRESSURE: 112 MMHG

## 2024-09-16 DIAGNOSIS — Z00.00 ENCOUNTER FOR GENERAL ADULT MEDICAL EXAMINATION W/OUT ABNORMAL FINDINGS: ICD-10-CM

## 2024-09-16 DIAGNOSIS — B97.7 PAPILLOMAVIRUS AS THE CAUSE OF DISEASES CLASSIFIED ELSEWHERE: ICD-10-CM

## 2024-09-16 PROCEDURE — 99395 PREV VISIT EST AGE 18-39: CPT

## 2024-09-16 NOTE — HISTORY OF PRESENT ILLNESS
[FreeTextEntry1] : 40 yo pt here for annual exam periods q 27 days off lithium, was hurting kidneys, off a month, doing better re mood, less agitiation.  on seroquel denies brianna, dc, pel pain, bladder problems.  alfonso during periods.

## 2024-09-18 LAB — HPV HIGH+LOW RISK DNA PNL CVX: DETECTED

## 2024-09-23 LAB — CYTOLOGY CVX/VAG DOC THIN PREP: ABNORMAL

## 2024-10-02 ENCOUNTER — RESULT REVIEW (OUTPATIENT)
Age: 39
End: 2024-10-02

## 2024-10-02 ENCOUNTER — OUTPATIENT (OUTPATIENT)
Dept: OUTPATIENT SERVICES | Facility: HOSPITAL | Age: 39
LOS: 1 days | End: 2024-10-02
Payer: COMMERCIAL

## 2024-10-02 ENCOUNTER — APPOINTMENT (OUTPATIENT)
Dept: MAMMOGRAPHY | Facility: CLINIC | Age: 39
End: 2024-10-02
Payer: MEDICAID

## 2024-10-02 DIAGNOSIS — Z12.31 ENCOUNTER FOR SCREENING MAMMOGRAM FOR MALIGNANT NEOPLASM OF BREAST: ICD-10-CM

## 2024-10-02 DIAGNOSIS — Z98.89 OTHER SPECIFIED POSTPROCEDURAL STATES: Chronic | ICD-10-CM

## 2024-10-02 PROCEDURE — 77067 SCR MAMMO BI INCL CAD: CPT

## 2024-10-02 PROCEDURE — 77063 BREAST TOMOSYNTHESIS BI: CPT | Mod: 26

## 2024-10-02 PROCEDURE — 77067 SCR MAMMO BI INCL CAD: CPT | Mod: 26

## 2024-10-02 PROCEDURE — 77063 BREAST TOMOSYNTHESIS BI: CPT

## 2024-10-03 DIAGNOSIS — R59.9 ENLARGED LYMPH NODES, UNSPECIFIED: ICD-10-CM

## 2024-10-03 NOTE — REASON FOR VISIT
[Follow-Up] : a follow-up evaluation of [Abnormal Uterine Bleeding] : abnormal uterine bleeding Spontaneous, unlabored and symmetrical

## 2024-10-28 ENCOUNTER — OUTPATIENT (OUTPATIENT)
Dept: OUTPATIENT SERVICES | Facility: HOSPITAL | Age: 39
LOS: 1 days | End: 2024-10-28
Payer: COMMERCIAL

## 2024-10-28 ENCOUNTER — APPOINTMENT (OUTPATIENT)
Dept: ULTRASOUND IMAGING | Facility: CLINIC | Age: 39
End: 2024-10-28
Payer: MEDICAID

## 2024-10-28 DIAGNOSIS — R59.9 ENLARGED LYMPH NODES, UNSPECIFIED: ICD-10-CM

## 2024-10-28 DIAGNOSIS — Z98.89 OTHER SPECIFIED POSTPROCEDURAL STATES: Chronic | ICD-10-CM

## 2024-10-28 PROCEDURE — 76882 US LMTD JT/FCL EVL NVASC XTR: CPT

## 2024-10-28 PROCEDURE — 76882 US LMTD JT/FCL EVL NVASC XTR: CPT | Mod: 26,RT

## 2025-01-13 ENCOUNTER — OFFICE (OUTPATIENT)
Dept: URBAN - METROPOLITAN AREA CLINIC 115 | Facility: CLINIC | Age: 40
Setting detail: OPHTHALMOLOGY
End: 2025-01-13
Payer: MEDICARE

## 2025-01-13 DIAGNOSIS — H52.7: ICD-10-CM

## 2025-01-13 DIAGNOSIS — H01.001: ICD-10-CM

## 2025-01-13 DIAGNOSIS — H01.004: ICD-10-CM

## 2025-01-13 PROCEDURE — 92015 DETERMINE REFRACTIVE STATE: CPT | Performed by: OPHTHALMOLOGY

## 2025-01-13 PROCEDURE — 92004 COMPRE OPH EXAM NEW PT 1/>: CPT | Performed by: OPHTHALMOLOGY

## 2025-01-13 ASSESSMENT — REFRACTION_CURRENTRX
OD_VPRISM_DIRECTION: SV
OS_CYLINDER: -0.50
OS_OVR_VA: 20/
OS_SPHERE: -1.50
OS_VPRISM_DIRECTION: SV
OS_AXIS: 088
OD_AXIS: 090
OD_SPHERE: -2.25
OD_OVR_VA: 20/
OD_CYLINDER: -0.50

## 2025-01-13 ASSESSMENT — REFRACTION_MANIFEST
OD_SPHERE: -2.25
OS_SPHERE: -2.00
OS_AXIS: 090
OD_VA1: 20/25-
OS_VA1: 20/30-2
OD_CYLINDER: -0.75
OD_AXIS: 090
OS_CYLINDER: -1.00

## 2025-01-13 ASSESSMENT — REFRACTION_AUTOREFRACTION
OD_AXIS: 116
OS_CYLINDER: -1.25
OS_SPHERE: -1.25
OS_AXIS: 094
OD_CYLINDER: -0.75
OD_SPHERE: -2.50

## 2025-01-13 ASSESSMENT — LID EXAM ASSESSMENTS
OS_BLEPHARITIS: LUL T
OD_BLEPHARITIS: RUL T

## 2025-01-13 ASSESSMENT — VISUAL ACUITY
OD_BCVA: 20/40+1
OS_BCVA: 20/30-1

## 2025-01-13 ASSESSMENT — CONFRONTATIONAL VISUAL FIELD TEST (CVF)
OD_FINDINGS: FULL
OS_FINDINGS: FULL

## 2025-01-13 ASSESSMENT — TONOMETRY
OS_IOP_MMHG: 17
OD_IOP_MMHG: 18

## 2025-03-24 ENCOUNTER — APPOINTMENT (OUTPATIENT)
Dept: OBGYN | Facility: CLINIC | Age: 40
End: 2025-03-24
Payer: MEDICAID

## 2025-03-24 VITALS
BODY MASS INDEX: 24.62 KG/M2 | DIASTOLIC BLOOD PRESSURE: 82 MMHG | SYSTOLIC BLOOD PRESSURE: 118 MMHG | WEIGHT: 172 LBS | HEIGHT: 70 IN

## 2025-03-24 DIAGNOSIS — R59.9 ENLARGED LYMPH NODES, UNSPECIFIED: ICD-10-CM

## 2025-03-24 DIAGNOSIS — B97.7 PAPILLOMAVIRUS AS THE CAUSE OF DISEASES CLASSIFIED ELSEWHERE: ICD-10-CM

## 2025-03-24 PROCEDURE — 99214 OFFICE O/P EST MOD 30 MIN: CPT

## 2025-03-27 LAB — HPV HIGH+LOW RISK DNA PNL CVX: NOT DETECTED

## 2025-03-31 LAB — CYTOLOGY CVX/VAG DOC THIN PREP: ABNORMAL

## 2025-04-01 DIAGNOSIS — B37.31 ACUTE CANDIDIASIS OF VULVA AND VAGINA: ICD-10-CM

## 2025-04-03 RX ORDER — TERCONAZOLE 8 MG/G
0.8 CREAM VAGINAL
Qty: 1 | Refills: 0 | Status: ACTIVE | COMMUNITY
Start: 2025-04-01

## 2025-04-24 ENCOUNTER — OUTPATIENT (OUTPATIENT)
Dept: OUTPATIENT SERVICES | Facility: HOSPITAL | Age: 40
LOS: 1 days | End: 2025-04-24
Payer: COMMERCIAL

## 2025-04-24 ENCOUNTER — APPOINTMENT (OUTPATIENT)
Dept: ULTRASOUND IMAGING | Facility: CLINIC | Age: 40
End: 2025-04-24
Payer: MEDICAID

## 2025-04-24 DIAGNOSIS — Z98.89 OTHER SPECIFIED POSTPROCEDURAL STATES: Chronic | ICD-10-CM

## 2025-04-24 DIAGNOSIS — Z00.8 ENCOUNTER FOR OTHER GENERAL EXAMINATION: ICD-10-CM

## 2025-04-24 PROCEDURE — 76770 US EXAM ABDO BACK WALL COMP: CPT

## 2025-04-24 PROCEDURE — 76770 US EXAM ABDO BACK WALL COMP: CPT | Mod: 26

## 2025-05-05 ENCOUNTER — OUTPATIENT (OUTPATIENT)
Dept: OUTPATIENT SERVICES | Facility: HOSPITAL | Age: 40
LOS: 1 days | End: 2025-05-05
Payer: COMMERCIAL

## 2025-05-05 ENCOUNTER — APPOINTMENT (OUTPATIENT)
Dept: ULTRASOUND IMAGING | Facility: CLINIC | Age: 40
End: 2025-05-05
Payer: MEDICAID

## 2025-05-05 DIAGNOSIS — R59.9 ENLARGED LYMPH NODES, UNSPECIFIED: ICD-10-CM

## 2025-05-05 DIAGNOSIS — Z98.89 OTHER SPECIFIED POSTPROCEDURAL STATES: Chronic | ICD-10-CM

## 2025-05-05 PROCEDURE — 76882 US LMTD JT/FCL EVL NVASC XTR: CPT

## 2025-05-05 PROCEDURE — 76882 US LMTD JT/FCL EVL NVASC XTR: CPT | Mod: 26,LT

## 2025-06-02 ENCOUNTER — APPOINTMENT (OUTPATIENT)
Dept: ULTRASOUND IMAGING | Facility: CLINIC | Age: 40
End: 2025-06-02
Payer: MEDICAID

## 2025-06-02 ENCOUNTER — RESULT REVIEW (OUTPATIENT)
Age: 40
End: 2025-06-02

## 2025-06-02 ENCOUNTER — OUTPATIENT (OUTPATIENT)
Dept: OUTPATIENT SERVICES | Facility: HOSPITAL | Age: 40
LOS: 1 days | End: 2025-06-02
Payer: COMMERCIAL

## 2025-06-02 DIAGNOSIS — Z98.89 OTHER SPECIFIED POSTPROCEDURAL STATES: Chronic | ICD-10-CM

## 2025-06-02 DIAGNOSIS — N63.20 UNSPECIFIED LUMP IN THE LEFT BREAST, UNSPECIFIED QUADRANT: ICD-10-CM

## 2025-06-02 PROCEDURE — 88342 IMHCHEM/IMCYTCHM 1ST ANTB: CPT

## 2025-06-02 PROCEDURE — 88189 FLOWCYTOMETRY/READ 16 & >: CPT

## 2025-06-02 PROCEDURE — 88307 TISSUE EXAM BY PATHOLOGIST: CPT | Mod: 26

## 2025-06-02 PROCEDURE — 88341 IMHCHEM/IMCYTCHM EA ADD ANTB: CPT

## 2025-06-02 PROCEDURE — 88307 TISSUE EXAM BY PATHOLOGIST: CPT

## 2025-06-02 PROCEDURE — 76942 ECHO GUIDE FOR BIOPSY: CPT | Mod: 26

## 2025-06-02 PROCEDURE — 88360 TUMOR IMMUNOHISTOCHEM/MANUAL: CPT | Mod: 26

## 2025-06-02 PROCEDURE — A4648: CPT

## 2025-06-02 PROCEDURE — 88342 IMHCHEM/IMCYTCHM 1ST ANTB: CPT | Mod: 26,59

## 2025-06-02 PROCEDURE — 38505 NEEDLE BIOPSY LYMPH NODES: CPT

## 2025-06-02 PROCEDURE — 88341 IMHCHEM/IMCYTCHM EA ADD ANTB: CPT | Mod: 26,59

## 2025-06-02 PROCEDURE — 76942 ECHO GUIDE FOR BIOPSY: CPT

## 2025-06-02 PROCEDURE — 88360 TUMOR IMMUNOHISTOCHEM/MANUAL: CPT

## 2025-06-02 PROCEDURE — 38505 NEEDLE BIOPSY LYMPH NODES: CPT | Mod: LT

## 2025-09-20 ENCOUNTER — NON-APPOINTMENT (OUTPATIENT)
Age: 40
End: 2025-09-20